# Patient Record
Sex: FEMALE | Race: WHITE | Employment: FULL TIME | URBAN - METROPOLITAN AREA
[De-identification: names, ages, dates, MRNs, and addresses within clinical notes are randomized per-mention and may not be internally consistent; named-entity substitution may affect disease eponyms.]

---

## 2018-08-17 ENCOUNTER — HOSPITAL ENCOUNTER (OUTPATIENT)
Dept: CV DIAGNOSTICS | Facility: HOSPITAL | Age: 53
Discharge: HOME | End: 2018-08-17
Attending: INTERNAL MEDICINE
Payer: COMMERCIAL

## 2018-08-17 DIAGNOSIS — I20.1 PRINZMETAL'S ANGINA (HCC): ICD-10-CM

## 2018-08-17 PROCEDURE — 93350 STRESS TTE ONLY: CPT | Performed by: INTERNAL MEDICINE

## 2018-08-17 PROCEDURE — 93306 TTE W/DOPPLER COMPLETE: CPT | Performed by: INTERNAL MEDICINE

## 2018-08-17 PROCEDURE — 93018 CV STRESS TEST I&R ONLY: CPT | Performed by: INTERNAL MEDICINE

## 2018-08-17 PROCEDURE — 93017 CV STRESS TEST TRACING ONLY: CPT | Performed by: INTERNAL MEDICINE

## 2018-08-17 NOTE — PROGRESS NOTES
CARDIAC DIAGNOSTICS PRELIMINARY REPORT    1.0 mm ST depression at peak exercise, resolved quickly in recovery. Rest: heart rate was 76 BPM, blood pressure was 110/58 mmHg, EKG showed NSR c/ NSSTTW changes.     Patient exercised for 8:46 minutes on a Bruc

## 2018-11-13 ENCOUNTER — PRIOR ORIGINAL RECORDS (OUTPATIENT)
Dept: OTHER | Age: 53
End: 2018-11-13

## 2018-11-15 ENCOUNTER — PRIOR ORIGINAL RECORDS (OUTPATIENT)
Dept: OTHER | Age: 53
End: 2018-11-15

## 2018-11-15 ENCOUNTER — LAB ENCOUNTER (OUTPATIENT)
Dept: LAB | Facility: HOSPITAL | Age: 53
End: 2018-11-15
Attending: INTERNAL MEDICINE
Payer: COMMERCIAL

## 2018-11-15 DIAGNOSIS — R94.39 ABNORMAL STRESS TEST: ICD-10-CM

## 2018-11-15 DIAGNOSIS — R06.00 DYSPNEA: Primary | ICD-10-CM

## 2018-11-15 PROCEDURE — 80048 BASIC METABOLIC PNL TOTAL CA: CPT

## 2018-11-15 PROCEDURE — 36415 COLL VENOUS BLD VENIPUNCTURE: CPT

## 2018-11-16 LAB
BUN: 20 MG/DL
CALCIUM: 8.8 MG/DL
CHLORIDE: 107 MEQ/L
CREATININE, SERUM: 0.66 MG/DL
GLUCOSE: 70 MG/DL
POTASSIUM, SERUM: 4.3 MEQ/L
SODIUM: 143 MEQ/L

## 2018-11-20 ENCOUNTER — PRIOR ORIGINAL RECORDS (OUTPATIENT)
Dept: OTHER | Age: 53
End: 2018-11-20

## 2018-11-20 ENCOUNTER — HOSPITAL ENCOUNTER (OUTPATIENT)
Dept: CT IMAGING | Facility: HOSPITAL | Age: 53
Discharge: HOME | End: 2018-11-20
Attending: INTERNAL MEDICINE
Payer: COMMERCIAL

## 2018-11-20 DIAGNOSIS — R94.39 ABNORMAL STRESS TEST: ICD-10-CM

## 2018-11-20 DIAGNOSIS — R06.00 DYSPNEA: ICD-10-CM

## 2018-11-20 PROCEDURE — 75574 CT ANGIO HRT W/3D IMAGE: CPT | Performed by: INTERNAL MEDICINE

## 2018-11-20 RX ORDER — NITROGLYCERIN 0.4 MG/1
0.4 TABLET SUBLINGUAL ONCE
Status: COMPLETED | OUTPATIENT
Start: 2018-11-20 | End: 2018-11-20

## 2018-11-20 RX ORDER — METOPROLOL TARTRATE 5 MG/5ML
INJECTION INTRAVENOUS
Status: DISCONTINUED
Start: 2018-11-20 | End: 2018-11-20 | Stop reason: WASHOUT

## 2018-11-20 RX ORDER — NITROGLYCERIN 0.4 MG/1
TABLET SUBLINGUAL
Status: COMPLETED
Start: 2018-11-20 | End: 2018-11-20

## 2018-11-20 RX ADMIN — NITROGLYCERIN 0.4 MG: 0.4 TABLET SUBLINGUAL at 11:33:00

## 2018-11-20 NOTE — PROGRESS NOTES
Following initial CTA coronary, Dr. Carl oLngoria was contacted to view images; requested a rescan with the use of Cardizem for HR control and also second administration of SL NTG; rescan completed and patient taken to recovery;  Report given to Elena Berry R.N.

## 2018-11-21 ENCOUNTER — PRIOR ORIGINAL RECORDS (OUTPATIENT)
Dept: OTHER | Age: 53
End: 2018-11-21

## 2018-11-21 LAB — UFCT: 0 CA SCORE

## 2018-12-18 ENCOUNTER — PRIOR ORIGINAL RECORDS (OUTPATIENT)
Dept: OTHER | Age: 53
End: 2018-12-18

## 2018-12-18 ENCOUNTER — MYAURORA ACCOUNT LINK (OUTPATIENT)
Dept: OTHER | Age: 53
End: 2018-12-18

## 2018-12-20 ENCOUNTER — PRIOR ORIGINAL RECORDS (OUTPATIENT)
Dept: OTHER | Age: 53
End: 2018-12-20

## 2019-01-27 ENCOUNTER — HOSPITAL (OUTPATIENT)
Dept: OTHER | Age: 54
End: 2019-01-27
Attending: INTERNAL MEDICINE

## 2019-01-27 LAB
A/G RATIO_: 1.2
ABS LYMPH: 2.5 K/CUMM (ref 1–3.5)
ABS MONO: 0.7 K/CUMM (ref 0.1–0.8)
ABS NEUTRO: 4.4 K/CUMM (ref 2–8)
ALBUMIN: 3.7 G/DL (ref 3.5–5)
ALK PHOS: 82 UNIT/L (ref 50–124)
ALT/GPT: 21 UNIT/L (ref 0–55)
ANION GAP SERPL CALC-SCNC: 12 MEQ/L (ref 10–20)
AST/GOT: 20 UNIT/L (ref 5–34)
BASOPHIL: 1 % (ref 0–1)
BILI TOTAL: 0.5 MG/DL (ref 0.2–1)
BUN SERPL-MCNC: 16 MG/DL (ref 6–20)
CALCIUM: 8.9 MG/DL (ref 8.4–10.2)
CHLORIDE: 106 MEQ/L (ref 97–107)
CREATININE: 0.81 MG/DL (ref 0.6–1.3)
DIFF_TYPE?: NORMAL
EOSINOPHIL: 2 % (ref 0–6)
GLOBULIN_: 3.1 G/DL (ref 2–4.1)
GLUCOSE LVL: 79 MG/DL (ref 70–99)
HCT VFR BLD CALC: 39 % (ref 33–45)
HEMOLYSIS 2+: NEGATIVE
HEMOLYSIS 2+: NEGATIVE
HEMOLYSIS 4+: NEGATIVE
HGB BLD-MCNC: 12.8 G/DL (ref 11–15)
ICTERIC 4+: NEGATIVE
IMMATURE GRAN: 0.3 % (ref 0–0.3)
INSTR WBC: 7.7 K/CUMM (ref 4–11)
LIPEMIC 3+: NEGATIVE
LYMPHOCYTE: 32 %
MAGNESIUM LEVEL: 2.1 MG/DL (ref 1.6–2.6)
MCH RBC QN AUTO: 30 PG (ref 25–35)
MCHC RBC AUTO-ENTMCNC: 33 G/DL (ref 32–37)
MCV RBC AUTO: 90 FL (ref 78–97)
MONOCYTE: 9 %
NEUTROPHIL: 56 %
NRBC BLD MANUAL-RTO: 0 % (ref 0–0.2)
PLATELET: 305 K/CUMM (ref 150–450)
POTASSIUM: 3.6 MEQ/L (ref 3.5–5.1)
RBC # BLD: 4.31 M/CUMM (ref 3.7–5.2)
RDW: 14.4 % (ref 11.5–14.5)
SODIUM: 139 MEQ/L (ref 136–145)
TCO2: 25 MEQ/L (ref 19–29)
TOTAL PROTEIN: 6.8 G/DL (ref 6.4–8.3)
TROPONIN I: 0.01 NG/ML
TROPONIN I: 0.01 NG/ML
TROPONIN I: <0.01 NG/ML
TSH SERPL-ACNC: 3.79 MIU/ML (ref 0.4–5)
WBC # BLD: 7.7 K/CUMM (ref 4–11)

## 2019-01-27 PROCEDURE — 93010 ELECTROCARDIOGRAM REPORT: CPT | Performed by: INTERNAL MEDICINE

## 2019-01-28 ENCOUNTER — PRIOR ORIGINAL RECORDS (OUTPATIENT)
Dept: OTHER | Age: 54
End: 2019-01-28

## 2019-01-28 LAB
ABS LYMPH: 3 K/CUMM (ref 1–3.5)
ABS MONO: 0.4 K/CUMM (ref 0.1–0.8)
ABS NEUTRO: 1.8 K/CUMM (ref 2–8)
ANION GAP SERPL CALC-SCNC: 16 MEQ/L (ref 10–20)
BASOPHIL: 1 % (ref 0–1)
BUN SERPL-MCNC: 16 MG/DL (ref 6–20)
CALCIUM: 8.4 MG/DL (ref 8.4–10.2)
CHLORIDE: 111 MEQ/L (ref 97–107)
CHOLESTEROL: 178 MG/DL
CREATININE: 0.66 MG/DL (ref 0.6–1.3)
DIFF_TYPE?: NORMAL
EOSINOPHIL: 2 % (ref 0–6)
GLUCOSE LVL: 82 MG/DL (ref 70–99)
HCT VFR BLD CALC: 37 % (ref 33–45)
HDLC SERPL-MCNC: 58 MG/DL (ref 40–60)
HEMOLYSIS 2+: NEGATIVE
HEMOLYSIS 2+: NEGATIVE
HEMOLYSIS 4+: NEGATIVE
HGB BLD-MCNC: 11.8 G/DL (ref 11–15)
ICTERIC 4+: NEGATIVE
IMMATURE GRAN: 0.2 % (ref 0–0.3)
INSTR WBC: 5.3 K/CUMM (ref 4–11)
LDLC SERPL CALC-MCNC: 109 MG/DL
LYMPHOCYTE: 56 %
MAGNESIUM LEVEL: 2 MG/DL (ref 1.6–2.6)
MCH RBC QN AUTO: 29 PG (ref 25–35)
MCHC RBC AUTO-ENTMCNC: 32 G/DL (ref 32–37)
MCV RBC AUTO: 90 FL (ref 78–97)
MONOCYTE: 8 %
NEUTROPHIL: 33 %
NRBC BLD MANUAL-RTO: 0 % (ref 0–0.2)
PLATELET: 268 K/CUMM (ref 150–450)
POTASSIUM: 4.2 MEQ/L (ref 3.5–5.1)
RBC # BLD: 4.06 M/CUMM (ref 3.7–5.2)
RDW: 14.5 % (ref 11.5–14.5)
SODIUM: 142 MEQ/L (ref 136–145)
TCO2: 19 MEQ/L (ref 19–29)
TRIGL SERPL-MCNC: 56 MG/DL
TROPONIN I: <0.01 NG/ML
WBC # BLD: 5.3 K/CUMM (ref 4–11)

## 2019-02-11 ENCOUNTER — PRIOR ORIGINAL RECORDS (OUTPATIENT)
Dept: OTHER | Age: 54
End: 2019-02-11

## 2019-02-12 LAB
ALKALINE PHOSPHATATE(ALK PHOS): 82 IU/L
BILIRUBIN TOTAL: 0.5 MG/DL
BUN: 16 MG/DL
CALCIUM: 8.4 MG/DL
CHLORIDE: 111 MEQ/L
CHOLESTEROL, TOTAL: 178 MG/DL
CREATININE, SERUM: 0.81 MG/DL
GLUCOSE: 79 MG/DL
HDL CHOLESTEROL: 58 MG/DL
HEMATOCRIT: 37 %
HEMOGLOBIN: 11.8 G/DL
LDL CHOLESTEROL: 109 MG/DL
MAGNESIUM: 2.1 MG/DL
PLATELETS: 268 K/UL
POTASSIUM, SERUM: 4.2 MEQ/L
PROTEIN, TOTAL: 6.8 G/DL
RED BLOOD COUNT: 4.06 X 10-6/U
SGOT (AST): 20 IU/L
SGPT (ALT): 21 IU/L
SODIUM: 142 MEQ/L
TRIGLYCERIDES: 56 MG/DL
WHITE BLOOD COUNT: 5.3 X 10-3/U

## 2019-02-19 ENCOUNTER — PRIOR ORIGINAL RECORDS (OUTPATIENT)
Dept: OTHER | Age: 54
End: 2019-02-19

## 2019-02-20 ENCOUNTER — PRIOR ORIGINAL RECORDS (OUTPATIENT)
Dept: OTHER | Age: 54
End: 2019-02-20

## 2019-02-28 VITALS
HEIGHT: 64 IN | DIASTOLIC BLOOD PRESSURE: 60 MMHG | WEIGHT: 187 LBS | BODY MASS INDEX: 31.92 KG/M2 | HEART RATE: 84 BPM | SYSTOLIC BLOOD PRESSURE: 100 MMHG

## 2019-02-28 VITALS
SYSTOLIC BLOOD PRESSURE: 100 MMHG | BODY MASS INDEX: 31.92 KG/M2 | HEIGHT: 64 IN | WEIGHT: 187 LBS | HEART RATE: 82 BPM | DIASTOLIC BLOOD PRESSURE: 78 MMHG

## 2019-02-28 VITALS
SYSTOLIC BLOOD PRESSURE: 98 MMHG | BODY MASS INDEX: 32.61 KG/M2 | WEIGHT: 191 LBS | HEART RATE: 78 BPM | DIASTOLIC BLOOD PRESSURE: 60 MMHG | HEIGHT: 64 IN

## 2019-03-05 RX ORDER — LISINOPRIL 10 MG/1
1 TABLET ORAL DAILY
COMMUNITY
Start: 2018-12-18 | End: 2020-02-27 | Stop reason: SDUPTHER

## 2019-03-05 RX ORDER — METOPROLOL SUCCINATE 25 MG/1
1 TABLET, EXTENDED RELEASE ORAL AT BEDTIME
COMMUNITY
Start: 2018-12-20 | End: 2019-03-28 | Stop reason: DRUGHIGH

## 2019-03-05 RX ORDER — LEVOTHYROXINE SODIUM 0.1 MG/1
100 TABLET ORAL DAILY
COMMUNITY

## 2019-03-05 RX ORDER — SOLIFENACIN SUCCINATE 10 MG/1
TABLET, FILM COATED ORAL
COMMUNITY
End: 2020-04-03

## 2019-03-11 PROBLEM — I49.3 PREMATURE VENTRICULAR CONTRACTIONS: Status: ACTIVE | Noted: 2019-02-19

## 2019-03-11 PROBLEM — I20.1 PRINZMETAL ANGINA (CMD): Status: ACTIVE | Noted: 2019-02-19

## 2019-03-11 PROBLEM — I42.9: Status: ACTIVE | Noted: 2019-02-19

## 2019-03-13 ENCOUNTER — TELEPHONE (OUTPATIENT)
Dept: CARDIOLOGY | Age: 54
End: 2019-03-13

## 2019-03-28 ENCOUNTER — TELEPHONE (OUTPATIENT)
Dept: CARDIOLOGY | Age: 54
End: 2019-03-28

## 2019-03-28 RX ORDER — METOPROLOL SUCCINATE 50 MG/1
50 TABLET, EXTENDED RELEASE ORAL DAILY
COMMUNITY
End: 2019-03-28 | Stop reason: DRUGHIGH

## 2019-03-28 RX ORDER — METOPROLOL SUCCINATE 50 MG/1
50 TABLET, EXTENDED RELEASE ORAL DAILY
Qty: 30 TABLET | Refills: 3 | Status: SHIPPED | OUTPATIENT
Start: 2019-03-28 | End: 2019-10-25 | Stop reason: SDUPTHER

## 2019-04-04 ENCOUNTER — TELEPHONE (OUTPATIENT)
Dept: CARDIOLOGY | Age: 54
End: 2019-04-04

## 2019-04-09 ENCOUNTER — TELEPHONE (OUTPATIENT)
Dept: CARDIOLOGY | Age: 54
End: 2019-04-09

## 2019-04-10 ENCOUNTER — HOSPITAL (OUTPATIENT)
Dept: OTHER | Age: 54
End: 2019-04-10
Attending: INTERNAL MEDICINE

## 2019-04-16 ENCOUNTER — TELEPHONE (OUTPATIENT)
Dept: CARDIOLOGY | Age: 54
End: 2019-04-16

## 2019-04-24 ENCOUNTER — TELEPHONE (OUTPATIENT)
Dept: CARDIOLOGY | Age: 54
End: 2019-04-24

## 2019-04-25 ENCOUNTER — TELEPHONE (OUTPATIENT)
Dept: CARDIOLOGY | Age: 54
End: 2019-04-25

## 2019-06-19 ENCOUNTER — TELEPHONE (OUTPATIENT)
Dept: CARDIOLOGY | Age: 54
End: 2019-06-19

## 2019-09-11 ENCOUNTER — HOSPITAL ENCOUNTER (EMERGENCY)
Facility: HOSPITAL | Age: 54
Discharge: HOME | End: 2019-09-11
Attending: EMERGENCY MEDICINE
Payer: COMMERCIAL

## 2019-09-11 ENCOUNTER — APPOINTMENT (OUTPATIENT)
Dept: CT IMAGING | Facility: HOSPITAL | Age: 54
End: 2019-09-11
Attending: NURSE PRACTITIONER
Payer: COMMERCIAL

## 2019-09-11 VITALS
SYSTOLIC BLOOD PRESSURE: 129 MMHG | HEIGHT: 67 IN | TEMPERATURE: 98 F | OXYGEN SATURATION: 98 % | HEART RATE: 62 BPM | BODY MASS INDEX: 29.03 KG/M2 | DIASTOLIC BLOOD PRESSURE: 74 MMHG | WEIGHT: 185 LBS | RESPIRATION RATE: 16 BRPM

## 2019-09-11 DIAGNOSIS — R10.9 ABDOMINAL PAIN, ACUTE: ICD-10-CM

## 2019-09-11 DIAGNOSIS — N30.01 ACUTE CYSTITIS WITH HEMATURIA: ICD-10-CM

## 2019-09-11 DIAGNOSIS — A08.4 VIRAL ENTERITIS: Primary | ICD-10-CM

## 2019-09-11 LAB
ALBUMIN SERPL-MCNC: 3.4 G/DL (ref 3.4–5)
ALBUMIN/GLOB SERPL: 1 {RATIO} (ref 1–2)
ALP LIVER SERPL-CCNC: 79 U/L (ref 41–108)
ALT SERPL-CCNC: 27 U/L (ref 13–56)
ANION GAP SERPL CALC-SCNC: 4 MMOL/L (ref 0–18)
AST SERPL-CCNC: 20 U/L (ref 15–37)
BASOPHILS # BLD AUTO: 0.06 X10(3) UL (ref 0–0.2)
BASOPHILS NFR BLD AUTO: 0.8 %
BILIRUB SERPL-MCNC: 0.3 MG/DL (ref 0.1–2)
BILIRUB UR QL STRIP.AUTO: NEGATIVE
BUN BLD-MCNC: 18 MG/DL (ref 7–18)
BUN/CREAT SERPL: 25 (ref 10–20)
CALCIUM BLD-MCNC: 8.5 MG/DL (ref 8.5–10.1)
CHLORIDE SERPL-SCNC: 112 MMOL/L (ref 98–112)
CLARITY UR REFRACT.AUTO: CLEAR
CO2 SERPL-SCNC: 27 MMOL/L (ref 21–32)
COLOR UR AUTO: YELLOW
CREAT BLD-MCNC: 0.72 MG/DL (ref 0.55–1.02)
DEPRECATED RDW RBC AUTO: 48.3 FL (ref 35.1–46.3)
EOSINOPHIL # BLD AUTO: 0.17 X10(3) UL (ref 0–0.7)
EOSINOPHIL NFR BLD AUTO: 2.2 %
ERYTHROCYTE [DISTWIDTH] IN BLOOD BY AUTOMATED COUNT: 14.7 % (ref 11–15)
EXPIRATION DATE: NORMAL
GLOBULIN PLAS-MCNC: 3.5 G/DL (ref 2.8–4.4)
GLUCOSE BLD-MCNC: 106 MG/DL (ref 70–99)
GLUCOSE UR STRIP.AUTO-MCNC: NEGATIVE MG/DL
HCT VFR BLD AUTO: 37.3 % (ref 35–48)
HGB BLD-MCNC: 12.2 G/DL (ref 12–16)
IMM GRANULOCYTES # BLD AUTO: 0.02 X10(3) UL (ref 0–1)
IMM GRANULOCYTES NFR BLD: 0.3 %
KETONES UR STRIP.AUTO-MCNC: NEGATIVE MG/DL
LIPASE SERPL-CCNC: 156 U/L (ref 73–393)
LYMPHOCYTES # BLD AUTO: 2.44 X10(3) UL (ref 1–4)
LYMPHOCYTES NFR BLD AUTO: 31.4 %
M PROTEIN MFR SERPL ELPH: 6.9 G/DL (ref 6.4–8.2)
MCH RBC QN AUTO: 29.3 PG (ref 26–34)
MCHC RBC AUTO-ENTMCNC: 32.7 G/DL (ref 31–37)
MCV RBC AUTO: 89.7 FL (ref 80–100)
MONOCYTES # BLD AUTO: 0.62 X10(3) UL (ref 0.1–1)
MONOCYTES NFR BLD AUTO: 8 %
NEUTROPHILS # BLD AUTO: 4.46 X10 (3) UL (ref 1.5–7.7)
NEUTROPHILS # BLD AUTO: 4.46 X10(3) UL (ref 1.5–7.7)
NEUTROPHILS NFR BLD AUTO: 57.3 %
NITRITE UR QL STRIP.AUTO: NEGATIVE
OSMOLALITY SERPL CALC.SUM OF ELEC: 298 MOSM/KG (ref 275–295)
PH UR STRIP.AUTO: 6 [PH] (ref 4.5–8)
PLATELET # BLD AUTO: 298 10(3)UL (ref 150–450)
POCT URINE PREGNANCY: NEGATIVE
POTASSIUM SERPL-SCNC: 3.7 MMOL/L (ref 3.5–5.1)
PROCEDURE CONTROL: YES
PROT UR STRIP.AUTO-MCNC: NEGATIVE MG/DL
RBC # BLD AUTO: 4.16 X10(6)UL (ref 3.8–5.3)
SODIUM SERPL-SCNC: 143 MMOL/L (ref 136–145)
SP GR UR STRIP.AUTO: 1.01 (ref 1–1.03)
UROBILINOGEN UR STRIP.AUTO-MCNC: <2 MG/DL
WBC # BLD AUTO: 7.8 X10(3) UL (ref 4–11)

## 2019-09-11 PROCEDURE — 81001 URINALYSIS AUTO W/SCOPE: CPT | Performed by: EMERGENCY MEDICINE

## 2019-09-11 PROCEDURE — 85025 COMPLETE CBC W/AUTO DIFF WBC: CPT

## 2019-09-11 PROCEDURE — 81001 URINALYSIS AUTO W/SCOPE: CPT

## 2019-09-11 PROCEDURE — 80053 COMPREHEN METABOLIC PANEL: CPT | Performed by: EMERGENCY MEDICINE

## 2019-09-11 PROCEDURE — 87186 SC STD MICRODIL/AGAR DIL: CPT | Performed by: EMERGENCY MEDICINE

## 2019-09-11 PROCEDURE — 83690 ASSAY OF LIPASE: CPT

## 2019-09-11 PROCEDURE — 99284 EMERGENCY DEPT VISIT MOD MDM: CPT

## 2019-09-11 PROCEDURE — 81025 URINE PREGNANCY TEST: CPT

## 2019-09-11 PROCEDURE — 87077 CULTURE AEROBIC IDENTIFY: CPT | Performed by: EMERGENCY MEDICINE

## 2019-09-11 PROCEDURE — 96372 THER/PROPH/DIAG INJ SC/IM: CPT

## 2019-09-11 PROCEDURE — 87086 URINE CULTURE/COLONY COUNT: CPT | Performed by: EMERGENCY MEDICINE

## 2019-09-11 PROCEDURE — 85025 COMPLETE CBC W/AUTO DIFF WBC: CPT | Performed by: EMERGENCY MEDICINE

## 2019-09-11 PROCEDURE — 83690 ASSAY OF LIPASE: CPT | Performed by: EMERGENCY MEDICINE

## 2019-09-11 PROCEDURE — 96374 THER/PROPH/DIAG INJ IV PUSH: CPT

## 2019-09-11 PROCEDURE — 96361 HYDRATE IV INFUSION ADD-ON: CPT

## 2019-09-11 PROCEDURE — 80053 COMPREHEN METABOLIC PANEL: CPT

## 2019-09-11 PROCEDURE — 74177 CT ABD & PELVIS W/CONTRAST: CPT | Performed by: NURSE PRACTITIONER

## 2019-09-11 PROCEDURE — 96375 TX/PRO/DX INJ NEW DRUG ADDON: CPT

## 2019-09-11 RX ORDER — ONDANSETRON 4 MG/1
4 TABLET, ORALLY DISINTEGRATING ORAL EVERY 4 HOURS PRN
Qty: 10 TABLET | Refills: 0 | Status: SHIPPED | OUTPATIENT
Start: 2019-09-11 | End: 2019-09-18

## 2019-09-11 RX ORDER — DICYCLOMINE HCL 20 MG
20 TABLET ORAL 4 TIMES DAILY PRN
Qty: 30 TABLET | Refills: 0 | Status: SHIPPED | OUTPATIENT
Start: 2019-09-11 | End: 2019-10-11

## 2019-09-11 RX ORDER — LEVOTHYROXINE SODIUM 175 UG/1
175 TABLET ORAL
COMMUNITY

## 2019-09-11 RX ORDER — ONDANSETRON 2 MG/ML
4 INJECTION INTRAMUSCULAR; INTRAVENOUS ONCE
Status: COMPLETED | OUTPATIENT
Start: 2019-09-11 | End: 2019-09-11

## 2019-09-11 RX ORDER — LISINOPRIL 10 MG/1
10 TABLET ORAL DAILY
COMMUNITY

## 2019-09-11 RX ORDER — DICYCLOMINE HYDROCHLORIDE 10 MG/ML
20 INJECTION INTRAMUSCULAR ONCE
Status: COMPLETED | OUTPATIENT
Start: 2019-09-11 | End: 2019-09-11

## 2019-09-11 RX ORDER — SODIUM CHLORIDE 9 MG/ML
INJECTION, SOLUTION INTRAVENOUS CONTINUOUS
Status: DISCONTINUED | OUTPATIENT
Start: 2019-09-11 | End: 2019-09-11

## 2019-09-11 RX ORDER — KETOROLAC TROMETHAMINE 30 MG/ML
15 INJECTION, SOLUTION INTRAMUSCULAR; INTRAVENOUS ONCE
Status: COMPLETED | OUTPATIENT
Start: 2019-09-11 | End: 2019-09-11

## 2019-09-11 RX ORDER — SULFAMETHOXAZOLE AND TRIMETHOPRIM 800; 160 MG/1; MG/1
1 TABLET ORAL 2 TIMES DAILY
Qty: 10 TABLET | Refills: 0 | Status: SHIPPED | OUTPATIENT
Start: 2019-09-11 | End: 2019-09-16

## 2019-09-11 NOTE — ED INITIAL ASSESSMENT (HPI)
Pt states sudden onset abd pain across lower abd. Pt states burning with urination, odor to urine and cloudy. Pt states nausea without emesis. Pt states hx of bowel obstructions.

## 2019-09-11 NOTE — ED PROVIDER NOTES
Patient Seen in: [facility] Emergency Department    History   Patient presents with:  Abdomen/Flank Pain (GI/)    Stated Complaint: abd pain    60-year-old female who presents to the emergency room with complaints of lower abdominal pain, pain wit Device None (Room air)       Current:/74   Pulse 62   Temp 97.6 °F (36.4 °C) (Temporal)   Resp 16   Ht 170.2 cm (5' 7\")   Wt 83.9 kg   SpO2 98%   Breastfeeding?  No   BMI 28.98 kg/m²         Physical Exam   Constitutional: She appears well-developed DIFFERENTIAL[[id number]]          Abnormal            Final result                 Please view results for these tests on the individual orders.    RAINBOW DRAW BLUE   RAINBOW DRAW LAVENDER   RAINBOW DRAW LIGHT GREEN   RAINBOW DRAW GOLD   URINE CULTURE, ROUT ORGANS:  No free fluid BONES:  Mild degenerative changes in the lower lumbar facets. CONCLUSION:  Fluid-filled bowel loops with mild wall thickening involving the left upper quadrant left mid abdomen.   This may represent enteritis in the appropriate List    START taking these medications    ondansetron 4 MG Oral Tablet Dispersible  Take 1 tablet (4 mg total) by mouth every 4 (four) hours as needed for Nausea.   Qty: 10 tablet Refills: 0    Dicyclomine HCl 20 MG Oral Tab  Take 1 tablet (20 mg total) by

## 2019-09-27 ENCOUNTER — APPOINTMENT (OUTPATIENT)
Dept: CARDIOLOGY | Age: 54
End: 2019-09-27

## 2019-10-25 RX ORDER — METOPROLOL SUCCINATE 50 MG/1
50 TABLET, EXTENDED RELEASE ORAL DAILY
Qty: 90 TABLET | Refills: 0 | Status: SHIPPED | OUTPATIENT
Start: 2019-10-25 | End: 2020-05-28

## 2019-12-17 ENCOUNTER — TELEPHONE (OUTPATIENT)
Dept: CARDIOLOGY | Age: 54
End: 2019-12-17

## 2020-01-10 ENCOUNTER — APPOINTMENT (OUTPATIENT)
Dept: CARDIOLOGY | Age: 55
End: 2020-01-10

## 2020-02-27 RX ORDER — LISINOPRIL 10 MG/1
TABLET ORAL DAILY
Qty: 90 TABLET | Refills: 3 | Status: SHIPPED | OUTPATIENT
Start: 2020-02-27

## 2020-04-03 ENCOUNTER — OFFICE VISIT (OUTPATIENT)
Dept: CARDIOLOGY | Age: 55
End: 2020-04-03

## 2020-04-03 VITALS
HEIGHT: 64 IN | WEIGHT: 183 LBS | SYSTOLIC BLOOD PRESSURE: 96 MMHG | DIASTOLIC BLOOD PRESSURE: 85 MMHG | BODY MASS INDEX: 31.24 KG/M2

## 2020-04-03 DIAGNOSIS — I49.3 PREMATURE VENTRICULAR CONTRACTIONS: ICD-10-CM

## 2020-04-03 DIAGNOSIS — I51.4 MYOCARDIAL FIBROSIS DETERMINED BY MAGNETIC RESONANCE IMAGING (CMD): ICD-10-CM

## 2020-04-03 DIAGNOSIS — I42.9 PRIMARY IDIOPATHIC CARDIOMYOPATHY (CMD): Primary | ICD-10-CM

## 2020-04-03 PROCEDURE — 99204 OFFICE O/P NEW MOD 45 MIN: CPT | Performed by: INTERNAL MEDICINE

## 2020-04-03 RX ORDER — TOLTERODINE 4 MG/1
4 CAPSULE, EXTENDED RELEASE ORAL DAILY
COMMUNITY

## 2020-04-03 ASSESSMENT — ENCOUNTER SYMPTOMS
BRUISES/BLEEDS EASILY: 0
FEVER: 0
SUSPICIOUS LESIONS: 0
COUGH: 0
WEIGHT LOSS: 0
CHILLS: 0
HEMOPTYSIS: 0
HEMATOCHEZIA: 0
WEIGHT GAIN: 0
ALLERGIC/IMMUNOLOGIC COMMENTS: NO NEW FOOD ALLERGIES

## 2020-04-03 ASSESSMENT — PATIENT HEALTH QUESTIONNAIRE - PHQ9
SUM OF ALL RESPONSES TO PHQ9 QUESTIONS 1 AND 2: 0
1. LITTLE INTEREST OR PLEASURE IN DOING THINGS: NOT AT ALL
SUM OF ALL RESPONSES TO PHQ9 QUESTIONS 1 AND 2: 0
2. FEELING DOWN, DEPRESSED OR HOPELESS: NOT AT ALL

## 2020-05-28 ENCOUNTER — TELEPHONE (OUTPATIENT)
Dept: CARDIOLOGY | Age: 55
End: 2020-05-28

## 2020-05-28 RX ORDER — METOPROLOL SUCCINATE 50 MG/1
50 TABLET, EXTENDED RELEASE ORAL DAILY
Qty: 90 TABLET | Refills: 0 | Status: SHIPPED | OUTPATIENT
Start: 2020-05-28 | End: 2020-07-09 | Stop reason: SDUPTHER

## 2020-06-25 ENCOUNTER — TELEPHONE (OUTPATIENT)
Dept: CARDIOLOGY | Age: 55
End: 2020-06-25

## 2020-06-26 ENCOUNTER — APPOINTMENT (OUTPATIENT)
Dept: CARDIOLOGY | Age: 55
End: 2020-06-26

## 2020-07-09 ENCOUNTER — ORDER TRANSCRIPTION (OUTPATIENT)
Dept: ADMINISTRATIVE | Facility: HOSPITAL | Age: 55
End: 2020-07-09

## 2020-07-09 ENCOUNTER — V-VISIT (OUTPATIENT)
Dept: CARDIOLOGY | Age: 55
End: 2020-07-09

## 2020-07-09 VITALS
WEIGHT: 183 LBS | SYSTOLIC BLOOD PRESSURE: 103 MMHG | HEIGHT: 64 IN | HEART RATE: 89 BPM | DIASTOLIC BLOOD PRESSURE: 69 MMHG | BODY MASS INDEX: 31.24 KG/M2

## 2020-07-09 DIAGNOSIS — I47.29 NSVT (NONSUSTAINED VENTRICULAR TACHYCARDIA) (CMD): ICD-10-CM

## 2020-07-09 DIAGNOSIS — I42.9 PRIMARY IDIOPATHIC CARDIOMYOPATHY (CMD): Primary | ICD-10-CM

## 2020-07-09 DIAGNOSIS — I49.3 CONTRACTION, PREMATURE, VENTRICULAR: ICD-10-CM

## 2020-07-09 DIAGNOSIS — I51.4 MYOCARDIAL FIBROSIS (HCC): ICD-10-CM

## 2020-07-09 DIAGNOSIS — I47.2 NSVT (NONSUSTAINED VENTRICULAR TACHYCARDIA) (HCC): ICD-10-CM

## 2020-07-09 DIAGNOSIS — I20.1 PRINZMETAL ANGINA (HCC): ICD-10-CM

## 2020-07-09 DIAGNOSIS — I20.1 PRINZMETAL ANGINA (CMD): ICD-10-CM

## 2020-07-09 DIAGNOSIS — I49.3 PREMATURE VENTRICULAR CONTRACTIONS: ICD-10-CM

## 2020-07-09 DIAGNOSIS — I42.9 IDIOPATHIC CARDIOMYOPATHY (HCC): Primary | ICD-10-CM

## 2020-07-09 DIAGNOSIS — I51.4 MYOCARDIAL FIBROSIS DETERMINED BY MAGNETIC RESONANCE IMAGING (CMD): ICD-10-CM

## 2020-07-09 PROCEDURE — 99213 OFFICE O/P EST LOW 20 MIN: CPT | Performed by: NURSE PRACTITIONER

## 2020-07-09 RX ORDER — METOPROLOL SUCCINATE 50 MG/1
75 TABLET, EXTENDED RELEASE ORAL DAILY
Qty: 140 TABLET | Refills: 3 | Status: SHIPPED | OUTPATIENT
Start: 2020-07-09

## 2020-07-09 RX ORDER — PANTOPRAZOLE SODIUM 40 MG/1
40 TABLET, DELAYED RELEASE ORAL DAILY
COMMUNITY
Start: 2020-06-26 | End: 2021-04-09

## 2020-07-09 ASSESSMENT — ENCOUNTER SYMPTOMS
NIGHT SWEATS: 0
SYNCOPE: 0
BLOATING: 0
ABDOMINAL PAIN: 0
SHORTNESS OF BREATH: 0
FEVER: 0
COUGH: 0
NEAR-SYNCOPE: 0
ORTHOPNEA: 0

## 2020-07-09 ASSESSMENT — PATIENT HEALTH QUESTIONNAIRE - PHQ9
SUM OF ALL RESPONSES TO PHQ9 QUESTIONS 1 AND 2: 0
2. FEELING DOWN, DEPRESSED OR HOPELESS: NOT AT ALL
CLINICAL INTERPRETATION OF PHQ9 SCORE: NO FURTHER SCREENING NEEDED
SUM OF ALL RESPONSES TO PHQ9 QUESTIONS 1 AND 2: 0
1. LITTLE INTEREST OR PLEASURE IN DOING THINGS: NOT AT ALL
CLINICAL INTERPRETATION OF PHQ2 SCORE: NO FURTHER SCREENING NEEDED

## 2020-11-20 ENCOUNTER — HOSPITAL ENCOUNTER (OUTPATIENT)
Dept: CV DIAGNOSTICS | Facility: HOSPITAL | Age: 55
Discharge: HOME | End: 2020-11-20
Attending: INTERNAL MEDICINE
Payer: COMMERCIAL

## 2020-11-20 ENCOUNTER — HOSPITAL ENCOUNTER (OUTPATIENT)
Dept: CV DIAGNOSTICS | Facility: HOSPITAL | Age: 55
End: 2020-11-20
Attending: INTERNAL MEDICINE
Payer: COMMERCIAL

## 2020-11-20 ENCOUNTER — HOSPITAL ENCOUNTER (OUTPATIENT)
Dept: CV DIAGNOSTICS | Facility: HOSPITAL | Age: 55
Discharge: HOME | End: 2020-11-20
Attending: NURSE PRACTITIONER
Payer: COMMERCIAL

## 2020-11-20 DIAGNOSIS — I51.4: ICD-10-CM

## 2020-11-20 DIAGNOSIS — I42.9 PRIMARY IDIOPATHIC CARDIOMYOPATHY (HCC): ICD-10-CM

## 2020-11-20 DIAGNOSIS — I49.3 PREMATURE VENTRICULAR CONTRACTIONS: ICD-10-CM

## 2020-11-20 DIAGNOSIS — I51.4 MYOCARDIAL FIBROSIS (HCC): ICD-10-CM

## 2020-11-20 DIAGNOSIS — I47.2 NSVT (NONSUSTAINED VENTRICULAR TACHYCARDIA) (HCC): ICD-10-CM

## 2020-11-20 DIAGNOSIS — I49.3 PREMATURE VENTRICULAR CONTRACTION: ICD-10-CM

## 2020-11-20 PROCEDURE — 93227 XTRNL ECG REC<48 HR R&I: CPT | Performed by: NURSE PRACTITIONER

## 2020-11-20 PROCEDURE — 93306 TTE W/DOPPLER COMPLETE: CPT | Performed by: INTERNAL MEDICINE

## 2020-11-20 PROCEDURE — 93226 XTRNL ECG REC<48 HR SCAN A/R: CPT | Performed by: NURSE PRACTITIONER

## 2020-11-20 PROCEDURE — 93225 XTRNL ECG REC<48 HRS REC: CPT | Performed by: NURSE PRACTITIONER

## 2020-11-23 ENCOUNTER — TELEPHONE (OUTPATIENT)
Dept: CARDIOLOGY | Age: 55
End: 2020-11-23

## 2020-11-27 ENCOUNTER — TELEPHONE (OUTPATIENT)
Dept: CARDIOLOGY | Age: 55
End: 2020-11-27

## 2020-12-01 ENCOUNTER — HOSPITAL ENCOUNTER (OUTPATIENT)
Dept: CT IMAGING | Facility: HOSPITAL | Age: 55
Discharge: HOME | End: 2020-12-01
Attending: NURSE PRACTITIONER
Payer: COMMERCIAL

## 2020-12-01 VITALS — HEART RATE: 68 BPM | SYSTOLIC BLOOD PRESSURE: 100 MMHG | DIASTOLIC BLOOD PRESSURE: 65 MMHG

## 2020-12-01 DIAGNOSIS — I49.3 PVC (PREMATURE VENTRICULAR CONTRACTION): ICD-10-CM

## 2020-12-01 DIAGNOSIS — I47.2 NSVT (NONSUSTAINED VENTRICULAR TACHYCARDIA) (HCC): ICD-10-CM

## 2020-12-01 DIAGNOSIS — I42.9 PRIMARY IDIOPATHIC CARDIOMYOPATHY (HCC): ICD-10-CM

## 2020-12-01 DIAGNOSIS — I20.1 PRINZMETAL ANGINA (HCC): ICD-10-CM

## 2020-12-01 PROCEDURE — 75574 CT ANGIO HRT W/3D IMAGE: CPT | Performed by: INTERNAL MEDICINE

## 2020-12-01 PROCEDURE — 75574 CT ANGIO HRT W/3D IMAGE: CPT | Performed by: NURSE PRACTITIONER

## 2020-12-01 PROCEDURE — 82565 ASSAY OF CREATININE: CPT

## 2020-12-01 RX ORDER — METOPROLOL TARTRATE 5 MG/5ML
INJECTION INTRAVENOUS
Status: DISCONTINUED
Start: 2020-12-01 | End: 2020-12-01 | Stop reason: WASHOUT

## 2020-12-01 RX ORDER — NITROGLYCERIN 0.4 MG/1
TABLET SUBLINGUAL
Status: COMPLETED
Start: 2020-12-01 | End: 2020-12-01

## 2020-12-01 RX ADMIN — NITROGLYCERIN 0.4 MG: 0.4 TABLET SUBLINGUAL at 10:54:00

## 2020-12-01 NOTE — IMAGING NOTE
Pt arrives to room 4 at 1010. Working with CT tech Darvin 327. IV established by Tierra to [facility] with 20 gauge angiocath x 2 attempt. Pt denies long acting nitrates. Pt positioned on CT table comfortably. Procedure explained and questions answered.  O2 applied vi

## 2020-12-03 ENCOUNTER — TELEPHONE (OUTPATIENT)
Dept: CARDIOLOGY | Age: 55
End: 2020-12-03

## 2021-04-09 ENCOUNTER — OFFICE VISIT (OUTPATIENT)
Dept: CARDIOLOGY | Age: 56
End: 2021-04-09

## 2021-04-09 VITALS
SYSTOLIC BLOOD PRESSURE: 90 MMHG | HEIGHT: 64 IN | DIASTOLIC BLOOD PRESSURE: 58 MMHG | BODY MASS INDEX: 32.61 KG/M2 | WEIGHT: 191 LBS | HEART RATE: 90 BPM

## 2021-04-09 DIAGNOSIS — I20.1 PRINZMETAL ANGINA (CMD): ICD-10-CM

## 2021-04-09 DIAGNOSIS — I42.9 PRIMARY IDIOPATHIC CARDIOMYOPATHY (CMD): ICD-10-CM

## 2021-04-09 DIAGNOSIS — I49.3 PREMATURE VENTRICULAR CONTRACTIONS: ICD-10-CM

## 2021-04-09 DIAGNOSIS — I51.4 MYOCARDIAL FIBROSIS DETERMINED BY MAGNETIC RESONANCE IMAGING (CMD): Primary | ICD-10-CM

## 2021-04-09 PROCEDURE — 99215 OFFICE O/P EST HI 40 MIN: CPT | Performed by: INTERNAL MEDICINE

## 2021-04-09 ASSESSMENT — PATIENT HEALTH QUESTIONNAIRE - PHQ9
CLINICAL INTERPRETATION OF PHQ9 SCORE: NO FURTHER SCREENING NEEDED
1. LITTLE INTEREST OR PLEASURE IN DOING THINGS: NOT AT ALL
SUM OF ALL RESPONSES TO PHQ9 QUESTIONS 1 AND 2: 0
CLINICAL INTERPRETATION OF PHQ2 SCORE: NO FURTHER SCREENING NEEDED
SUM OF ALL RESPONSES TO PHQ9 QUESTIONS 1 AND 2: 0
2. FEELING DOWN, DEPRESSED OR HOPELESS: NOT AT ALL

## 2021-04-09 ASSESSMENT — ENCOUNTER SYMPTOMS
COUGH: 0
BRUISES/BLEEDS EASILY: 0
HEMATOCHEZIA: 0
HEMOPTYSIS: 0
ALLERGIC/IMMUNOLOGIC COMMENTS: NO NEW FOOD ALLERGIES
SUSPICIOUS LESIONS: 0
FEVER: 0
WEIGHT LOSS: 0
WEIGHT GAIN: 0
LIGHT-HEADEDNESS: 1
CHILLS: 0

## 2021-07-12 ENCOUNTER — TELEPHONE (OUTPATIENT)
Dept: CARDIOLOGY | Age: 56
End: 2021-07-12

## 2022-04-15 ENCOUNTER — APPOINTMENT (OUTPATIENT)
Dept: CARDIOLOGY | Age: 57
End: 2022-04-15

## 2022-04-26 ENCOUNTER — HOSPITAL ENCOUNTER (OUTPATIENT)
Dept: LAB | Facility: HOSPITAL | Age: 57
Discharge: HOME | End: 2022-04-26
Attending: INTERNAL MEDICINE
Payer: COMMERCIAL

## 2022-04-26 LAB
BASOPHILS # BLD AUTO: 0.05 X10(3) UL (ref 0–0.2)
BASOPHILS NFR BLD AUTO: 0.8 %
CHOLEST SERPL-MCNC: 203 MG/DL (ref ?–200)
EOSINOPHIL # BLD AUTO: 0.1 X10(3) UL (ref 0–0.7)
EOSINOPHIL NFR BLD AUTO: 1.5 %
ERYTHROCYTE [DISTWIDTH] IN BLOOD BY AUTOMATED COUNT: 14.3 %
FASTING PATIENT LIPID ANSWER: YES
HCT VFR BLD AUTO: 38.9 %
HDLC SERPL-MCNC: 68 MG/DL (ref 40–59)
HGB BLD-MCNC: 12.6 G/DL
IMM GRANULOCYTES # BLD AUTO: 0.02 X10(3) UL (ref 0–1)
IMM GRANULOCYTES NFR BLD: 0.3 %
LDLC SERPL CALC-MCNC: 119 MG/DL (ref ?–100)
LYMPHOCYTES # BLD AUTO: 1.78 X10(3) UL (ref 1–4)
LYMPHOCYTES NFR BLD AUTO: 27.1 %
MCH RBC QN AUTO: 29 PG (ref 26–34)
MCHC RBC AUTO-ENTMCNC: 32.4 G/DL (ref 31–37)
MCV RBC AUTO: 89.4 FL
MONOCYTES # BLD AUTO: 0.54 X10(3) UL (ref 0.1–1)
MONOCYTES NFR BLD AUTO: 8.2 %
NEUTROPHILS # BLD AUTO: 4.08 X10 (3) UL (ref 1.5–7.7)
NEUTROPHILS # BLD AUTO: 4.08 X10(3) UL (ref 1.5–7.7)
NEUTROPHILS NFR BLD AUTO: 62.1 %
NONHDLC SERPL-MCNC: 135 MG/DL (ref ?–130)
PLATELET # BLD AUTO: 324 10(3)UL (ref 150–450)
RBC # BLD AUTO: 4.35 X10(6)UL
TRIGL SERPL-MCNC: 88 MG/DL (ref 30–149)
VLDLC SERPL CALC-MCNC: 15 MG/DL (ref 0–30)
WBC # BLD AUTO: 6.6 X10(3) UL (ref 4–11)

## 2022-04-26 PROCEDURE — 80061 LIPID PANEL: CPT | Performed by: INTERNAL MEDICINE

## 2022-04-26 PROCEDURE — 36415 COLL VENOUS BLD VENIPUNCTURE: CPT | Performed by: INTERNAL MEDICINE

## 2022-04-26 PROCEDURE — 85025 COMPLETE CBC W/AUTO DIFF WBC: CPT | Performed by: INTERNAL MEDICINE

## 2024-04-15 ENCOUNTER — HOSPITAL ENCOUNTER (EMERGENCY)
Facility: HOSPITAL | Age: 59
Discharge: HOME | End: 2024-04-15
Attending: EMERGENCY MEDICINE
Payer: COMMERCIAL

## 2024-04-15 ENCOUNTER — APPOINTMENT (OUTPATIENT)
Dept: CT IMAGING | Facility: HOSPITAL | Age: 59
End: 2024-04-15
Attending: EMERGENCY MEDICINE
Payer: COMMERCIAL

## 2024-04-15 ENCOUNTER — APPOINTMENT (OUTPATIENT)
Dept: GENERAL RADIOLOGY | Facility: HOSPITAL | Age: 59
End: 2024-04-15
Payer: COMMERCIAL

## 2024-04-15 VITALS
RESPIRATION RATE: 10 BRPM | DIASTOLIC BLOOD PRESSURE: 76 MMHG | BODY MASS INDEX: 30.73 KG/M2 | HEIGHT: 64 IN | HEART RATE: 67 BPM | WEIGHT: 180 LBS | OXYGEN SATURATION: 100 % | TEMPERATURE: 99 F | SYSTOLIC BLOOD PRESSURE: 100 MMHG

## 2024-04-15 DIAGNOSIS — R07.89 CHEST PAIN, ATYPICAL: ICD-10-CM

## 2024-04-15 DIAGNOSIS — R51.9 NONINTRACTABLE HEADACHE, UNSPECIFIED CHRONICITY PATTERN, UNSPECIFIED HEADACHE TYPE: Primary | ICD-10-CM

## 2024-04-15 LAB
ALBUMIN SERPL-MCNC: 3.3 G/DL (ref 3.4–5)
ALBUMIN/GLOB SERPL: 1 {RATIO} (ref 1–2)
ALP LIVER SERPL-CCNC: 66 U/L
ALT SERPL-CCNC: 18 U/L
ANION GAP SERPL CALC-SCNC: 2 MMOL/L (ref 0–18)
AST SERPL-CCNC: 14 U/L (ref 15–37)
ATRIAL RATE: 61 BPM
BASOPHILS # BLD AUTO: 0.05 X10(3) UL (ref 0–0.2)
BASOPHILS NFR BLD AUTO: 0.7 %
BILIRUB SERPL-MCNC: 0.4 MG/DL (ref 0.1–2)
BUN BLD-MCNC: 18 MG/DL (ref 9–23)
CALCIUM BLD-MCNC: 8.2 MG/DL (ref 8.5–10.1)
CHLORIDE SERPL-SCNC: 115 MMOL/L (ref 98–112)
CO2 SERPL-SCNC: 27 MMOL/L (ref 21–32)
CREAT BLD-MCNC: 0.7 MG/DL
EGFRCR SERPLBLD CKD-EPI 2021: 100 ML/MIN/1.73M2 (ref 60–?)
EOSINOPHIL # BLD AUTO: 0.12 X10(3) UL (ref 0–0.7)
EOSINOPHIL NFR BLD AUTO: 1.6 %
ERYTHROCYTE [DISTWIDTH] IN BLOOD BY AUTOMATED COUNT: 16.6 %
GLOBULIN PLAS-MCNC: 3.2 G/DL (ref 2.8–4.4)
GLUCOSE BLD-MCNC: 85 MG/DL (ref 70–99)
GLUCOSE BLD-MCNC: 87 MG/DL (ref 70–99)
HCT VFR BLD AUTO: 32.9 %
HGB BLD-MCNC: 10.5 G/DL
IMM GRANULOCYTES # BLD AUTO: 0.01 X10(3) UL (ref 0–1)
IMM GRANULOCYTES NFR BLD: 0.1 %
LYMPHOCYTES # BLD AUTO: 1.8 X10(3) UL (ref 1–4)
LYMPHOCYTES NFR BLD AUTO: 24.1 %
MCH RBC QN AUTO: 26.1 PG (ref 26–34)
MCHC RBC AUTO-ENTMCNC: 31.9 G/DL (ref 31–37)
MCV RBC AUTO: 81.6 FL
MONOCYTES # BLD AUTO: 0.7 X10(3) UL (ref 0.1–1)
MONOCYTES NFR BLD AUTO: 9.4 %
NEUTROPHILS # BLD AUTO: 4.79 X10 (3) UL (ref 1.5–7.7)
NEUTROPHILS # BLD AUTO: 4.79 X10(3) UL (ref 1.5–7.7)
NEUTROPHILS NFR BLD AUTO: 64.1 %
OSMOLALITY SERPL CALC.SUM OF ELEC: 299 MOSM/KG (ref 275–295)
P AXIS: 44 DEGREES
P-R INTERVAL: 170 MS
PLATELET # BLD AUTO: 350 10(3)UL (ref 150–450)
POTASSIUM SERPL-SCNC: 3.8 MMOL/L (ref 3.5–5.1)
PROT SERPL-MCNC: 6.5 G/DL (ref 6.4–8.2)
Q-T INTERVAL: 434 MS
QRS DURATION: 116 MS
QTC CALCULATION (BEZET): 436 MS
R AXIS: -44 DEGREES
RBC # BLD AUTO: 4.03 X10(6)UL
SODIUM SERPL-SCNC: 144 MMOL/L (ref 136–145)
T AXIS: 24 DEGREES
TROPONIN I SERPL HS-MCNC: 13 NG/L
VENTRICULAR RATE: 61 BPM
WBC # BLD AUTO: 7.5 X10(3) UL (ref 4–11)

## 2024-04-15 PROCEDURE — 93010 ELECTROCARDIOGRAM REPORT: CPT

## 2024-04-15 PROCEDURE — 96375 TX/PRO/DX INJ NEW DRUG ADDON: CPT

## 2024-04-15 PROCEDURE — 70450 CT HEAD/BRAIN W/O DYE: CPT | Performed by: EMERGENCY MEDICINE

## 2024-04-15 PROCEDURE — 93005 ELECTROCARDIOGRAM TRACING: CPT

## 2024-04-15 PROCEDURE — 96374 THER/PROPH/DIAG INJ IV PUSH: CPT

## 2024-04-15 PROCEDURE — 80053 COMPREHEN METABOLIC PANEL: CPT | Performed by: EMERGENCY MEDICINE

## 2024-04-15 PROCEDURE — 82962 GLUCOSE BLOOD TEST: CPT

## 2024-04-15 PROCEDURE — 84484 ASSAY OF TROPONIN QUANT: CPT | Performed by: EMERGENCY MEDICINE

## 2024-04-15 PROCEDURE — 99285 EMERGENCY DEPT VISIT HI MDM: CPT

## 2024-04-15 PROCEDURE — 85025 COMPLETE CBC W/AUTO DIFF WBC: CPT | Performed by: EMERGENCY MEDICINE

## 2024-04-15 PROCEDURE — 71045 X-RAY EXAM CHEST 1 VIEW: CPT | Performed by: EMERGENCY MEDICINE

## 2024-04-15 PROCEDURE — 96361 HYDRATE IV INFUSION ADD-ON: CPT

## 2024-04-15 RX ORDER — SODIUM CHLORIDE 9 MG/ML
INJECTION, SOLUTION INTRAVENOUS ONCE
Status: COMPLETED | OUTPATIENT
Start: 2024-04-15 | End: 2024-04-15

## 2024-04-15 RX ORDER — METOCLOPRAMIDE HYDROCHLORIDE 5 MG/ML
10 INJECTION INTRAMUSCULAR; INTRAVENOUS ONCE
Status: COMPLETED | OUTPATIENT
Start: 2024-04-15 | End: 2024-04-15

## 2024-04-15 RX ORDER — DIPHENHYDRAMINE HYDROCHLORIDE 50 MG/ML
25 INJECTION INTRAMUSCULAR; INTRAVENOUS ONCE
Status: COMPLETED | OUTPATIENT
Start: 2024-04-15 | End: 2024-04-15

## 2024-04-15 RX ORDER — KETOROLAC TROMETHAMINE 15 MG/ML
15 INJECTION, SOLUTION INTRAMUSCULAR; INTRAVENOUS ONCE
Status: COMPLETED | OUTPATIENT
Start: 2024-04-15 | End: 2024-04-15

## 2024-04-15 RX ORDER — AMLODIPINE BESYLATE 2.5 MG/1
TABLET ORAL DAILY
COMMUNITY

## 2024-04-15 RX ORDER — ACETAMINOPHEN 500 MG
1000 TABLET ORAL ONCE
Status: COMPLETED | OUTPATIENT
Start: 2024-04-15 | End: 2024-04-15

## 2024-04-15 NOTE — DISCHARGE INSTRUCTIONS
Can take 2 extreme Tylenol every 6-8 hours.  Can take ibuprofen 600 mg 3 times a day with food.  Return if increasing headache, fevers, worsening symptoms or new complaints.  Return if chest pain returns, shortness of breath, new complaints

## 2024-04-15 NOTE — ED PROVIDER NOTES
Patient Seen in: [facility] Emergency Department      History     Chief Complaint   Patient presents with    Chest Pain Angina    Headache     Stated Complaint: chest pain    Subjective:   HPI    Patient is a very pleasant 58-year-old woman presents with multiple complaints.  Says she initially had flulike symptoms over the proximal last 2 weeks.  They improved the last 4 to 5 days and she was feeling well.  Today she developed diffuse headache approximately 2 hours prior to coming to the ER.  She came dizzy and lightheaded.  She developed shortness of breath and chest tightness after this.  She does have a history of Prinzmetal's angina.  She has photophobia.  No focal numbness or weakness.  She felt like her glucose was dropping had brief slurred speech at home that is resolved.  No other specific complaints.  Patient is otherwise at her medical baseline.    Objective:   Past Medical History:    Arrhythmia    Heart attack (HCC)    Hyperlipidemia    Thyroid disease              History reviewed. No pertinent surgical history.             Social History     Socioeconomic History    Marital status:    Tobacco Use    Smoking status: Some Days     Types: Cigarettes    Smokeless tobacco: Never              Review of Systems    Positive for stated complaint: chest pain  Other systems are as noted in HPI.  Constitutional and vital signs reviewed.      All other systems reviewed and negative except as noted above.    Physical Exam     ED Triage Vitals   BP 04/15/24 0958 112/78   Pulse 04/15/24 0958 68   Resp 04/15/24 0958 12   Temp 04/15/24 1006 99.3 °F (37.4 °C)   Temp src 04/15/24 1006 Temporal   SpO2 04/15/24 0958 99 %   O2 Device 04/15/24 0958 None (Room air)       Current:/76   Pulse 67   Temp 99.3 °F (37.4 °C) (Temporal)   Resp 10   Ht 162.6 cm (5' 4\")   Wt 81.6 kg   SpO2 100%   BMI 30.90 kg/m²         Physical Exam    General: Patient is resting comfortably in no acute distress  HEENT:  Normal cephalic atraumatic.  Nonicteric sclera.  Moist mucous membranes.  No meningismus.  No adenopathy  Lungs: No tachypnea.  Lungs clear to auscultation bilaterally without rales/rhonchi.  Equal breath sounds bilaterally  Cardiac: No tachycardia.  No murmurs.  Regular rate and rhythm.  Abdomen: Soft and nontender throughout.  No rebound or guarding  Extremities: No clubbing/cyanosis/edema.  Skin: No rashes, no pallor  Neuro: Awake oriented ×3.  Nonfocal.  Good strength throughout.  Normal speech.  Nonfocal.  No meningismus.    ED Course     Labs Reviewed   COMP METABOLIC PANEL (14) - Abnormal; Notable for the following components:       Result Value    Chloride 115 (*)     Calcium, Total 8.2 (*)     Calculated Osmolality 299 (*)     AST 14 (*)     Albumin 3.3 (*)     All other components within normal limits   CBC W/ DIFFERENTIAL - Abnormal; Notable for the following components:    HGB 10.5 (*)     HCT 32.9 (*)     All other components within normal limits   TROPONIN I HIGH SENSITIVITY - Normal   POCT GLUCOSE - Normal   CBC WITH DIFFERENTIAL WITH PLATELET    Narrative:     The following orders were created for panel order CBC With Differential With Platelet.  Procedure                               Abnormality         Status                     ---------                               -----------         ------                     CBC W/ DIFFERENTIAL[[id number]]          Abnormal            Final result                 Please view results for these tests on the individual orders.   RAINBOW DRAW LAVENDER   RAINBOW DRAW LIGHT GREEN   RAINBOW DRAW BLUE     EKG    Rate, intervals and axes as noted on EKG Report.  Rate: 61  Rhythm: Sinus Rhythm  Reading: Normal sinus rhythm.  No acute ST-T wave changes.  LVH.  No changes versus an old EKG.  Otherwise, agree with EKG report                 CT brain: I personally reviewed the films and my independent interpertaion showed no acute hemorrhage.  Official report reviewed and  normal.     Chest x-ray: I personally reviewed the films and my independent interpertaion showed no acute pathology.  Official report reviewed  Electrolytes reviewed.  Troponin 13.  White count 7.5.  Glucose normal.    MDM      Patient is a 58-year-old woman who presents with abrupt onset of headache.  She did have flulike illness 2 weeks ago though symptoms resolved approximate 4 to 5 days ago.  Headache was 2 to 3 hours of coming to the ER.  Throbbing bilaterally.  Mild photophobia.  Patient does have a remote history of headaches per  arrived later.  Differential clues of acute subarachnoid hemorrhage, other.  She also developed chest pain and shortness of breath after the headache.  Has a history of Prinzmetal's.  No acute ischemic changes.  Differential atypical coronary disease, other.  She was hydrated.  She was treated with Toradol Reglan and Benadryl.  She went for CT of the brain which shows no evidence of acute hemorrhage, bleeding.  Given that the CT is within 6 hours of onset of symptoms should be approximately 100% for ruling out subarachnoid hemorrhage.  She felt much better.  I did offer MRI/MRA though symptoms are essentially resolved.  Atypical chest pain.  No EKG changes.  Troponin is normal.  Chest pain is resolved.  Discussed with patient, possible atypical migraine versus other causes of headache.  She is well-appearing essentially back to baseline now.  She is to return if recurrent symptoms or new complaints.  If not to follow-up primary care.  Consider neurology.  Patient voices understanding.                                   [facility]    Disposition and Plan     Clinical Impression:  1. Nonintractable headache, unspecified chronicity pattern, unspecified headache type    2. Chest pain, atypical         Disposition:  Discharge  4/15/2024 12:41 pm    Follow-up:  Noman Otero MD  [address] [facility] [id number] [phone]    Follow up in 1 week(s)      [facility] [place] [address] [address]  [facility] [id number] [phone]  Follow up in 1 week(s)            Medications Prescribed:  Discharge Medication List as of 4/15/2024  1:07 PM

## 2024-04-15 NOTE — ED QUICK NOTES
Rounding Completed    Plan of Care reviewed. Waiting for MRI.  Provided a pillow.    Bed is locked and in lowest position. Call light within reach.

## 2024-04-15 NOTE — ED INITIAL ASSESSMENT (HPI)
Patient to ED via EMS.  Reports she has been sick x 2 weeks, but better last week.  + lightheadedness and nausea on the way to work.  + headache and diaphoretic while at work, thought her glucose was dropping, took a glucose tablet.  Felt like she had difficulty breathing and some pressure on her chest .  Patient given 325 aspirin enroute, reports her chest is feeling better.  Continues to have a headache and dry mouth.

## 2024-04-15 NOTE — ED QUICK NOTES
Patient reports she is feeling better, headache and dizziness are decreasing.  Patient and  feel comfortable being discharged at this time.

## 2024-04-15 NOTE — ED QUICK NOTES
Patient assisted to the restroom, + dizziness when standing , reports she feels better when she keeps her eyes closes.  Patient to CT scan at this time

## (undated) NOTE — ED AVS SNAPSHOT
Mrs. Becki Donnelly   MRN: [id number]    Department:  [facility] Emergency Department   Date of Visit:  9/11/2019           Disclosure     Insurance plans vary and the physician(s) referred by the ER may not be covered by your plan.  Please contac tell this physician (or your personal doctor if your instructions are to return to your personal doctor) about any new or lasting problems. The primary care or specialist physician will see patients referred from the [facility] Emergency Department.  Nitin Mo